# Patient Record
Sex: FEMALE | Race: WHITE | NOT HISPANIC OR LATINO | URBAN - METROPOLITAN AREA
[De-identification: names, ages, dates, MRNs, and addresses within clinical notes are randomized per-mention and may not be internally consistent; named-entity substitution may affect disease eponyms.]

---
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
## 2018-10-30 ENCOUNTER — OFFICE VISIT - HEALTHEAST (OUTPATIENT)
Dept: FAMILY MEDICINE | Facility: CLINIC | Age: 18
End: 2018-10-30

## 2018-10-30 DIAGNOSIS — R50.9 FEVER: ICD-10-CM

## 2018-10-30 DIAGNOSIS — Z20.828 EXPOSURE TO INFLUENZA: ICD-10-CM

## 2018-10-30 DIAGNOSIS — R11.2 NAUSEA WITH VOMITING: ICD-10-CM

## 2018-10-30 DIAGNOSIS — R19.7 DIARRHEA: ICD-10-CM

## 2018-10-30 LAB
DEPRECATED S PYO AG THROAT QL EIA: NORMAL
FLUAV AG SPEC QL IA: NORMAL
FLUBV AG SPEC QL IA: NORMAL

## 2018-10-30 RX ORDER — ONDANSETRON 4 MG/1
4 TABLET, ORALLY DISINTEGRATING ORAL EVERY 8 HOURS PRN
Qty: 6 TABLET | Refills: 0 | Status: SHIPPED | OUTPATIENT
Start: 2018-10-30

## 2018-10-31 LAB — GROUP A STREP BY PCR: NORMAL

## 2019-03-26 ENCOUNTER — COMMUNICATION - HEALTHEAST (OUTPATIENT)
Dept: TELEHEALTH | Facility: CLINIC | Age: 19
End: 2019-03-26

## 2019-03-26 ENCOUNTER — OFFICE VISIT - HEALTHEAST (OUTPATIENT)
Dept: FAMILY MEDICINE | Facility: CLINIC | Age: 19
End: 2019-03-26

## 2019-03-26 DIAGNOSIS — R03.0 ELEVATED BP WITHOUT DIAGNOSIS OF HYPERTENSION: ICD-10-CM

## 2019-03-26 DIAGNOSIS — R00.0 TACHYCARDIA: ICD-10-CM

## 2019-03-26 DIAGNOSIS — R25.1 SHAKINESS: ICD-10-CM

## 2019-11-12 ENCOUNTER — COMMUNICATION - HEALTHEAST (OUTPATIENT)
Dept: SCHEDULING | Facility: CLINIC | Age: 19
End: 2019-11-12

## 2021-05-27 NOTE — PROGRESS NOTES
Chief Complaint   Patient presents with     Emesis     nausea and vomiting x 1 day     Dizziness     x 1 day     Fever     yesterday 99.6         HPI      Patient is here for symptoms started around noon yesterday - nausea, shakiness, lightheadedness, and temp to 99.6. She has not had anything to ear or drink since last night. No vomiting, diarrhea, abdominal pain, urinary problems. No cough. No recent travel nor dietary changes. Her roommate is here for similar symptoms.     ROS: Pertinent ROS noted in HPI.     No Known Allergies    There is no problem list on file for this patient.      No family history on file.    Social History     Socioeconomic History     Marital status: Single     Spouse name: Not on file     Number of children: Not on file     Years of education: Not on file     Highest education level: Not on file   Occupational History     Not on file   Social Needs     Financial resource strain: Not on file     Food insecurity:     Worry: Not on file     Inability: Not on file     Transportation needs:     Medical: Not on file     Non-medical: Not on file   Tobacco Use     Smoking status: Current Every Day Smoker     Smokeless tobacco: Never Used   Substance and Sexual Activity     Alcohol use: No     Drug use: No     Sexual activity: Not on file   Lifestyle     Physical activity:     Days per week: Not on file     Minutes per session: Not on file     Stress: Not on file   Relationships     Social connections:     Talks on phone: Not on file     Gets together: Not on file     Attends Nondenominational service: Not on file     Active member of club or organization: Not on file     Attends meetings of clubs or organizations: Not on file     Relationship status: Not on file     Intimate partner violence:     Fear of current or ex partner: Not on file     Emotionally abused: Not on file     Physically abused: Not on file     Forced sexual activity: Not on file   Other Topics Concern     Not on file   Social History  Narrative     Not on file           Objective:      Vitals:    03/26/19 0907 03/26/19 0910   BP: (!) 157/112 (!) 150/100   Patient Site: Right Arm Right Arm   Patient Position: Sitting Sitting   Cuff Size: Adult Regular Adult Regular   Pulse: (!) 128    Resp: 20    Temp: 98.4  F (36.9  C)    TempSrc: Oral    SpO2: 100%    Weight: 132 lb 4 oz (60 kg)      Repeat pulse = 124    Gen:NAD  Oropharynx: moist mucus membranes.  CV: Tachycardic, regular rhythm,  no M, R, G  Pulm: CTAB, normal effort  Abd: normal inspection, normal bowel sounds, soft, no pain, no mass/HSM  Neuro: AAO x 3    A&P:    Anita VALENCIA Giuseppe is a 18 y.o. female who presented with one day of having nausea, with lightheadedness, shakiness found to be tachycardic and having elevated BP here. She has not had any oral intake since yesterday. Recommended further care in ER. Spoke with [facility]ol[facility]'s ER provider. Patient going to ER by private car (driven by her roommate).

## 2021-05-31 VITALS — WEIGHT: 138.38 LBS

## 2021-06-02 VITALS — WEIGHT: 136 LBS

## 2021-06-02 VITALS — WEIGHT: 132.25 LBS

## 2021-06-03 NOTE — TELEPHONE ENCOUNTER
Patient states that she has had pain to left upper arm since this morning and is presently 3 on 0-10 scale when active and 1 when at rest.  She wonders if her nexplon birth control implanted device has broken and causing pain.  Reviewed signs and symptoms to call back for with patient.   She states she will go to see her [facility] PCP tomorrow for assessment.  Lynda Stephens RN, Triage    Reason for Disposition    [1] MILD pain (e.g., does not interfere with normal activities) AND [2] present > 7 days    Protocols used: ARM PAIN-A-AH

## 2021-06-14 NOTE — PROGRESS NOTES
Walk-In Clinic Note    SUBJECTIVE:     Chief Complaint   Patient presents with     Nausea     exposed to strep. Nausea started last night       Anita Chin is a 17 y.o. female who presents today for evaluation of Nausea.  This started yesterday and she did have one episode of vomiting.  She has been able to tolerate clears without any difficulty.  She was around a friend for the past several days who did have strep throat and she is wondering if that is what she has.  She denies any fevers.    History   Smoking Status     Never Smoker   Smokeless Tobacco     Never Used     PSH: tonsillectomy    Allergies:   No Known Allergies    OBJECTIVE:   Vitals:    12/10/17 1113 12/10/17 1152   BP: 122/78    Patient Site: Right Arm    Patient Position: Sitting    Cuff Size: Adult Regular    Pulse: (!) 101 75   Resp: 18    Temp: 98.3  F (36.8  C)    TempSrc: Oral    SpO2: 96% 99%   Weight: 138 lb 6 oz (62.8 kg)       General: Alert and in no acute distress.  Eyes: Pupils equal and reactive to light. EOMs intact. No conjunctival injection.  ENT: Mucous membranes moist and pink. No tonsilar hypertrophy, erythema or exudate. Bilateral TMs pearly grey with clear canals. Nares clear.  Abd: soft, NT    ASSESSMENT AND PLAN:   1. Exposure to strep throat  - Rapid Strep A Screen-Throat  - Group A Strep, RNA Direct Detection, Throat    Negative rapid strep.  Normal exam.  Likely some sort of viral process causing her nausea. offered Zofran for nausea but patient declines.  Advise follow-up if she is not getting better.    Did have some tachycardia, this resolved on recheck.    Melvin Maier MD   pager: [phone]

## 2021-06-21 NOTE — PROGRESS NOTES
Chief Complaint   Patient presents with     Poss stomach flu     Sunday night she threw up until yesterday.     Fever     Last week it was 101. Yesterday it was 96.7.          HPI      Patient is here for symptoms started Sunday night. She reported nonbloody watery diarrhea, and nausea with vomiting. Associated symptoms included fever to 101 on Sunday, improved since. She was exposed to Influenza in a coworker. 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 She had two episodes of diarrhea, yesterday, and one episode today (this AM). Her last vomiting was yesterday, but still has nausea today. Minimal abdominal discomfort, and generalized body aches. No cough, chest pain, shortness of breath.    ROS: Pertinent ROS noted in HPI.     No Known Allergies    There is no problem list on file for this patient.      No family history on file.    Social History     Social History     Marital status: Single     Spouse name: N/A     Number of children: N/A     Years of education: N/A     Occupational History     Not on file.     Social History Main Topics     Smoking status: Current Every Day Smoker     Smokeless tobacco: Never Used     Alcohol use No     Drug use: No     Sexual activity: Not on file     Other Topics Concern     Not on file     Social History Narrative         Objective:    Vitals:    10/30/18 1826   BP: 103/61   Pulse: 88   Resp: 18   Temp: 99.1  F (37.3  C)   SpO2: 98%       Gen:NAD  Throat: oropharynx clear, tonsils normal, moist mucus membranes  CV: RRR, normal S1S2,no M, R, G  Pulm: CTAB, normal effort  Abd: normal bowel sounds, soft, mild epigastric tenderness to palapation, no mass/HSM  Skin: dry, warm, no acute lesions      Nausea with vomiting - improving, fluids, and supportive cares as directed.   -     ondansetron (ZOFRAN ODT) 4 MG disintegrating tablet; Take 1 tablet (4 mg total) by mouth every 8 (eight) hours as needed for nausea.    Diarrhea  - improving. Fluids and supportive  cares as directed.     Exposure to influenza  -     Influenza A/B Rapid Test- Nasal Swab    Fever  -     Rapid Strep A Screen-Throat  -     Group A Strep, RNA Direct Detection, Throat      Suspect viral illness. Reassuring exam. Supportive cares and f/u as directed.